# Patient Record
(demographics unavailable — no encounter records)

---

## 2025-01-15 NOTE — SOCIAL HISTORY
[FreeTextEntry1] : She was born, raised in [place]. She graduated HS. First marriage : six years, one daughter in [place], sons in [place], [place] Second marriage: two years She denies recent alcohol, illicit drugs or cigarette use.

## 2025-01-15 NOTE — PHYSICAL EXAM
[Well groomed] : well groomed [Average] : average [Cooperative] : cooperative [Euthymic] : euthymic [Full] : full [Clear] : clear [Tangential] : tangential [WNL] : within normal limits [de-identified] : Alert, oriented to person, place, month, year [de-identified] : Good [de-identified] : Good
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
## 2025-01-15 NOTE — PAST MEDICAL HISTORY
[FreeTextEntry1] : She follows up with pmd, cardiologist, pulmonologist, podiatrist. Neurologist- Dr. Billingsley

## 2025-01-15 NOTE — PLAN
[Medication education provided] : Medication education provided. [Rationale for medication choices, possible risks/precautions, benefits, alternative treatment choices, and consequences of non-treatment discussed] : Rationale for medication choices, possible risks/precautions, benefits, alternative treatment choices, and consequences of non-treatment discussed with patient/family/caregiver  [FreeTextEntry4] : She will maintain stability of mood symptoms.

## 2025-01-15 NOTE — HISTORY OF PRESENT ILLNESS
[FreeTextEntry1] : Case transferred from Dr. Baez Tigist sleeps well, her mood, anxiety symptoms are under control. She denies psychotic symptoms. Her appetite is good. She tries to keep her home clean, keeps busy with reading, weekly Bible study.  She does not drive. She has history of falls, advised to follow up with pmd. As per daughter, April Brittany, she has been doing well. Her long term memory is intact, her short term memory tends to be effected. She is adherent with Sertaline, denies side effects, she would like to lower medication as she does not like to taking them. She denies thoughts of harm to self or others. Total time in session: forty five minutes. [FreeTextEntry2] : No inpatient hospitalizations, trials including Paxil, Bupropion.

## 2025-01-15 NOTE — REVIEW OF SYSTEMS
[Negative] : Endocrine [FreeTextEntry2] : hearing impairment [FreeTextEntry9] : right > left knee tenderness, bilateral hip tenderness

## 2025-05-13 NOTE — PAST MEDICAL HISTORY
[FreeTextEntry1] : She follows up with pmd,  cardiologist, Atrial fibrillation is under control pulmonologist, podiatrist. Neurologist- Dr. Billingsley

## 2025-05-13 NOTE — REASON FOR VISIT
[Medical Office: ([facility])___] : The provider was located at the medical office in [unfilled]. [Home] : The patient, [unfilled], was located at home, [unfilled], at the time of the visit. [Verbal consent obtained from patient/other participant(s)] : Verbal consent for telehealth/telephonic services obtained from patient/other participant(s) [Patient] : Patient [FreeTextEntry4] : 2pm [FreeTextEntry5] : 2:30pm [FreeTextEntry1] : Follow-up for mood, anxiety symptoms

## 2025-05-13 NOTE — HISTORY OF PRESENT ILLNESS
[FreeTextEntry1] : Tigist preferred to be on the higher dose of Sertraline, thus, dose was increased to 200mg. She sleeps well, her mood is 'fine', she denies recent bouts of anxiety. She experiences someone calling her, visualizes people who are not present, she is not fearful. She denies paranoia or delusions. Her appetite is good. She keeps busy with reading, enjoys weekly Bible study.  She denies recent falls. As per daughter, April Entrieri, bouts of tearfulness have improved, she is increasingly organized with increase in Sertraline. Furosemide was initiated.  Daughter will contact neurologist for follow up. She is adherent with Sertraline, denies side effects,. She denies thoughts of harm to self or others.  [FreeTextEntry2] : No inpatient hospitalizations, trials including Paxil, Bupropion.
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
## 2025-05-13 NOTE — SOCIAL HISTORY
[FreeTextEntry1] : She was born, raised in [place]. She graduated HS. First marriage : six years, one daughter in [place], sons in [place], [place] Second marriage: two years She denies recent alcohol, illicit drugs or cigarette use. She does not drive.

## 2025-05-13 NOTE — PHYSICAL EXAM
[Well groomed] : well groomed [Average] : average [Cooperative] : cooperative [Full] : full [Clear] : clear [WNL] : within normal limits [Linear/Goal Directed] : linear/goal directed [FreeTextEntry8] : 'I feel good' [de-identified] : Alert, oriented to person, place, month, year [de-identified] : Good [de-identified] : Good [4 - Moderately ill] : 4 - Moderately ill  (overt symptoms causing noticeable, but modest, functional impairment or distress; symptom level may warrant medication) [2 - Much improved] : 2 - Much improved  (notably better with significant reduction of symptoms; increase in the level of functioning but some symptoms remain)

## 2025-06-18 NOTE — REASON FOR VISIT
[Medical Office: ([facility])___] : The provider was located at the medical office in [unfilled]. [Home] : The patient, [unfilled], was located at home, [unfilled], at the time of the visit. [Verbal consent obtained from patient/other participant(s)] : Verbal consent for telehealth/telephonic services obtained from patient/other participant(s) [Patient] : Patient [Family Member] : family member [FreeTextEntry4] : 1:30pm [FreeTextEntry5] : 2pm [TextBox_17] : Daughter, April Brittany [FreeTextEntry1] : Follow-up for mood, anxiety symptoms

## 2025-06-18 NOTE — HISTORY OF PRESENT ILLNESS
[FreeTextEntry1] : Tigist sleeps well, her mood is stable, she experiences bouts of anxiety, incorporates self soothing techniques. Her energy level, appetite are good. She is restricted to engage in activities due to myalgias. She experiences someone calling her, denies recent episodes of visual hallucinations of people who are not present, paranoia or delusions. She enjoys reading, weekly Bible study, speaking to a friend. She will complete labs in the fall. She is adherent with Sertraline, denies side effects. She denies thoughts of harm to self or others. As per daughter, Elvie Soto, she has not experienced recent falls. Pmd would like for her to follow up neurologist.  [FreeTextEntry2] : No inpatient hospitalizations, trials including Paxil, Bupropion.

## 2025-06-18 NOTE — PHYSICAL EXAM
[Well groomed] : well groomed [Average] : average [Cooperative] : cooperative [Full] : full [Clear] : clear [Linear/Goal Directed] : linear/goal directed [WNL] : within normal limits [4 - Moderately ill] : 4 - Moderately ill  (overt symptoms causing noticeable, but modest, functional impairment or distress; symptom level may warrant medication) [2 - Much improved] : 2 - Much improved  (notably better with significant reduction of symptoms; increase in the level of functioning but some symptoms remain)  [FreeTextEntry8] : 'I feel good' [de-identified] : Alert, oriented to person, place, month, year [de-identified] : Good [de-identified] : Good

## 2025-06-18 NOTE — PLAN
[Medication education provided] : Medication education provided. [Rationale for medication choices, possible risks/precautions, benefits, alternative treatment choices, and consequences of non-treatment discussed] : Rationale for medication choices, possible risks/precautions, benefits, alternative treatment choices, and consequences of non-treatment discussed with patient/family/caregiver  [FreeTextEntry4] : She will maintain stability of mood symptoms.  She will incorporate self soothing techniques for anxiety symptoms.

## 2025-06-18 NOTE — SOCIAL HISTORY
[FreeTextEntry1] : She was born, raised in [place]. She graduated HS. First marriage : six years, one daughter in [place], sons in [place], [place] Second marriage: two years She denies recent alcohol, illicit drugs or cigarette use. She does not drive.

## 2025-06-18 NOTE — DISCUSSION/SUMMARY
[FreeTextEntry1] : Tigist is 84yo female, history of depression, anxiety, hx of TBI.  She sleeps well, her mood is stable, she experiences bouts of anxiety, incorporates self soothing techniques. She experiences someone calling her, denies recent episodes of visual hallucinations of people who are not present, paranoia or delusions.   Sertraline 200mg po daily  Furosemide 20mg po twice weekly
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
